# Patient Record
(demographics unavailable — no encounter records)

---

## 2025-04-28 NOTE — DISCUSSION/SUMMARY
[FreeTextEntry1] : Palpitations /Tachycardia The working diagnosis is sinus tachycardia exacerbated by secretion of endogenous catecholamines.  The history is consistent with this diagnosis.  The differential diagnosis would include nonsustained atrial arrhythmias.  In the setting of presumed normal left ventricular systolic function the risk for the development of a malignant ventricular arrhythmia is low.  An electrocardiogram during symptoms would be most helpful for diagnosis.  I have recommended the following: Echocardiogram Zio patch 2-week mobile telemetry study   Multiple risk factors for atherosclerotic heart disease Dorcas has multiple risk factors for the development of atherosclerotic heart disease including a family history prediabetes and hyperlipidemia.  However there is no evidence of such to date.  There are no symptoms of angina and prior stress studies were reportedly normal.  Screening noninvasive myocardial ischemic evaluation would be helpful for management decisions.  I have recommended the following: Screening CT coronary calcium score Exercise treadmill ECG study Prior cardiac records/testing   (Dr. Roe) not available at this time are requested for review    Hyperlipidemia Hyperlipidemia represents a risk factor for atherosclerotic heart disease.  The target LDL level for primary prevention is less than 100 HMG Co. a reductase inhibitor therapy has been effective.  In 2/25 the serum cholesterol level was 162 HDL 78 triglycerides 65 and LDL 71  I have recommended the following Low-fat low-cholesterol heart healthy diet.  Regular aerobic exercise Continue the present medical regimen Target LDL level to less than 100 as discussed above.    The diagnosis, prognosis, risks, options and alternatives were explained at length to the patient and family.  All questions were answered issues discussed included palpitations cardiac arrhythmias atherosclerotic heart disease hyperlipidemia noninvasive cardiac testing diet and exercise

## 2025-04-28 NOTE — PHYSICAL EXAM
[Normal Conjunctiva] : normal conjunctiva [Normal S1, S2] : normal S1, S2 [Clear Lung Fields] : clear lung fields [Soft] : abdomen soft [Non Tender] : non-tender [Normal Bowel Sounds] : normal bowel sounds [Normal Gait] : normal gait [No Rash] : no rash [de-identified] : Blood pressure 140/70 mmHg respirations 16/minute pulse 70/minute height 4 feet 11 inches tall weight 150 pounds.  Oxygen saturation 97% 70Appears fit in no distress lying flat [de-identified] : No neck vein distention.  No carotid bruit.  Thyroid not palpable [de-identified] : No murmur.  No gallop.  No diastolic sounds.  Point of maximal impulse normal and not displaced [de-identified] : Full range of motion [de-identified] : No peripheral edema.  Dorsalis pedis pulses +2 bilaterally.  Feet warm and well-perfused.  No ulcerations. [de-identified] : pleasant

## 2025-04-28 NOTE — HISTORY OF PRESENT ILLNESS
[FreeTextEntry1] : 78-year-old female Cardiology consultation requested by Dr. Henao because of palpitations and diaphoresis.  Shannan has no known heart disease.  There is no history of a myocardial infarction angina or congestive heart failure.  Palpitations  15 to 20 years ago  led to noninvasive testing including a Holter monitor and stress test which were reportedly normal.  The details of the evaluation/medical records are not available.  Mrs. Soni suffers from severe Meniere's disease manifested by marked vertigo with vomiting.  She had a particular violent episode on 4/17/2024  This was manifested by retching and palpitations with a rapid heartbeat and associated diaphoresis.  There were paresthesias in both upper extremities.  The symptoms lasted approximately 7 hours.  There is no history of hypertension or smoking.  Shannan  is thought to be prediabetic.There is a previous history of hyperlipidemia.  Shannan presents today for cardiovascular evaluation.  She is accompanied by her .

## 2025-04-28 NOTE — PHYSICAL EXAM
[Normal Conjunctiva] : normal conjunctiva [Normal S1, S2] : normal S1, S2 [Clear Lung Fields] : clear lung fields [Soft] : abdomen soft [Non Tender] : non-tender [Normal Bowel Sounds] : normal bowel sounds [Normal Gait] : normal gait [No Rash] : no rash [de-identified] : Blood pressure 140/70 mmHg respirations 16/minute pulse 70/minute height 4 feet 11 inches tall weight 150 pounds.  Oxygen saturation 97% 70Appears fit in no distress lying flat [de-identified] : No neck vein distention.  No carotid bruit.  Thyroid not palpable [de-identified] : No murmur.  No gallop.  No diastolic sounds.  Point of maximal impulse normal and not displaced [de-identified] : Full range of motion [de-identified] : No peripheral edema.  Dorsalis pedis pulses +2 bilaterally.  Feet warm and well-perfused.  No ulcerations. [de-identified] : pleasant

## 2025-04-28 NOTE — PHYSICAL EXAM
[Normal Conjunctiva] : normal conjunctiva [Normal S1, S2] : normal S1, S2 [Clear Lung Fields] : clear lung fields [Soft] : abdomen soft [Non Tender] : non-tender [Normal Bowel Sounds] : normal bowel sounds [Normal Gait] : normal gait [No Rash] : no rash [de-identified] : Blood pressure 140/70 mmHg respirations 16/minute pulse 70/minute height 4 feet 11 inches tall weight 150 pounds.  Oxygen saturation 97% 70Appears fit in no distress lying flat [de-identified] : No neck vein distention.  No carotid bruit.  Thyroid not palpable [de-identified] : No murmur.  No gallop.  No diastolic sounds.  Point of maximal impulse normal and not displaced [de-identified] : Full range of motion [de-identified] : No peripheral edema.  Dorsalis pedis pulses +2 bilaterally.  Feet warm and well-perfused.  No ulcerations. [de-identified] : pleasant

## 2025-05-21 NOTE — DISCUSSION/SUMMARY
[de-identified] :  I had a long discussion with the patient about the nature of their shoulder problem and all available treatment options. We discussed operative and non-operative interventions. After a long discussion, they would like to proceed with a one-time steroid injection followed by structured physical therapy. We also discussed use of anti-inflammatory medications as well as their risks and benefits. I will see them back in approximately 6 weeks' time. If they are still having pain, I would recommend an MRI of the shoulder to evaluate the status of the rotator cuff. All of their questions were answered, they agree with the treatment plan. Procedure: After obtaining verbal consent and under sterile conditions, 1.5cc of 1% Lidocaine and 1cc of 40mg of Kenalog was injected into the subacromial space via an posterior approach. They tolerated this well. This injection was done under ultrasound guidance

## 2025-05-21 NOTE — PHYSICAL EXAM
[de-identified] : Well appearing person in no acute distress. Appears their stated age. Alert and oriented x3. Normal mood and affect. They have good range of motion of their shoulder today with the exception that they are lacking about 10 degrees of forward flexion and abduction. They have tenderness over their biceps. Positive Kenedy's test. Positive throwing test. Positive Speed's test. Positive Brito test. They have good strength testing on their rotator cuff with some discomfort on testing of their supraspinatus. They have mild deformity and tenderness over their AC joint. No pain with range of motion of their neck or elbow. Neurovascularly intact in their upper extremity. [ ]  [de-identified] : 4 views right shoulder ordered and reviewed.  These demonstrate arthritis within her AC joint.  She has mild glenohumeral arthritis.  Alignment is well-maintained.

## 2025-05-21 NOTE — HISTORY OF PRESENT ILLNESS
[de-identified] : Very pleasant 78-year-old right-hand-dominant female comes in with right shoulder discomfort.  This been going on for some time.  She also notices that there is a bump on her shoulder.  This is nontender to her.  No recent trauma.  No recent treatment.  No real issues with the shoulder in the past.

## 2025-05-21 NOTE — ASSESSMENT
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

## 2025-05-21 NOTE — DISCUSSION/SUMMARY
[de-identified] :  I had a long discussion with the patient about the nature of their shoulder problem and all available treatment options. We discussed operative and non-operative interventions. After a long discussion, they would like to proceed with a one-time steroid injection followed by structured physical therapy. We also discussed use of anti-inflammatory medications as well as their risks and benefits. I will see them back in approximately 6 weeks' time. If they are still having pain, I would recommend an MRI of the shoulder to evaluate the status of the rotator cuff. All of their questions were answered, they agree with the treatment plan. Procedure: After obtaining verbal consent and under sterile conditions, 1.5cc of 1% Lidocaine and 1cc of 40mg of Kenalog was injected into the subacromial space via an posterior approach. They tolerated this well. This injection was done under ultrasound guidance

## 2025-05-21 NOTE — PHYSICAL EXAM
[de-identified] : Well appearing person in no acute distress. Appears their stated age. Alert and oriented x3. Normal mood and affect. They have good range of motion of their shoulder today with the exception that they are lacking about 10 degrees of forward flexion and abduction. They have tenderness over their biceps. Positive Traill's test. Positive throwing test. Positive Speed's test. Positive Brito test. They have good strength testing on their rotator cuff with some discomfort on testing of their supraspinatus. They have mild deformity and tenderness over their AC joint. No pain with range of motion of their neck or elbow. Neurovascularly intact in their upper extremity. [ ]  [de-identified] : 4 views right shoulder ordered and reviewed.  These demonstrate arthritis within her AC joint.  She has mild glenohumeral arthritis.  Alignment is well-maintained.

## 2025-05-21 NOTE — HISTORY OF PRESENT ILLNESS
[de-identified] : Very pleasant 78-year-old right-hand-dominant female comes in with right shoulder discomfort.  This been going on for some time.  She also notices that there is a bump on her shoulder.  This is nontender to her.  No recent trauma.  No recent treatment.  No real issues with the shoulder in the past.

## 2025-05-21 NOTE — ASSESSMENT
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